# Patient Record
Sex: FEMALE | Race: AMERICAN INDIAN OR ALASKA NATIVE
[De-identification: names, ages, dates, MRNs, and addresses within clinical notes are randomized per-mention and may not be internally consistent; named-entity substitution may affect disease eponyms.]

---

## 2021-12-07 ENCOUNTER — HOSPITAL ENCOUNTER (EMERGENCY)
Dept: HOSPITAL 5 - ED | Age: 46
Discharge: HOME | End: 2021-12-07
Payer: COMMERCIAL

## 2021-12-07 VITALS — SYSTOLIC BLOOD PRESSURE: 135 MMHG | DIASTOLIC BLOOD PRESSURE: 74 MMHG

## 2021-12-07 DIAGNOSIS — G44.209: ICD-10-CM

## 2021-12-07 DIAGNOSIS — R00.2: ICD-10-CM

## 2021-12-07 DIAGNOSIS — R42: Primary | ICD-10-CM

## 2021-12-07 DIAGNOSIS — R07.9: ICD-10-CM

## 2021-12-07 LAB
ALBUMIN SERPL-MCNC: 4.4 G/DL (ref 3.9–5)
ALT SERPL-CCNC: 21 UNITS/L (ref 7–56)
BASOPHILS # (AUTO): 0 K/MM3 (ref 0–0.1)
BASOPHILS NFR BLD AUTO: 0.6 % (ref 0–1.8)
BUN SERPL-MCNC: 11 MG/DL (ref 7–17)
BUN/CREAT SERPL: 14 %
CALCIUM SERPL-MCNC: 9.8 MG/DL (ref 8.4–10.2)
EOSINOPHIL # BLD AUTO: 0.4 K/MM3 (ref 0–0.4)
EOSINOPHIL NFR BLD AUTO: 5.5 % (ref 0–4.3)
HCT VFR BLD CALC: 42.7 % (ref 30.3–42.9)
HEMOLYSIS INDEX: 9
HGB BLD-MCNC: 13.4 GM/DL (ref 10.1–14.3)
LYMPHOCYTES # BLD AUTO: 1.7 K/MM3 (ref 1.2–5.4)
LYMPHOCYTES NFR BLD AUTO: 22.2 % (ref 13.4–35)
MCHC RBC AUTO-ENTMCNC: 31 % (ref 30–34)
MCV RBC AUTO: 80 FL (ref 79–97)
MONOCYTES # (AUTO): 0.5 K/MM3 (ref 0–0.8)
MONOCYTES % (AUTO): 6.5 % (ref 0–7.3)
PLATELET # BLD: 395 K/MM3 (ref 140–440)
RBC # BLD AUTO: 5.32 M/MM3 (ref 3.65–5.03)

## 2021-12-07 PROCEDURE — 99284 EMERGENCY DEPT VISIT MOD MDM: CPT

## 2021-12-07 PROCEDURE — 85025 COMPLETE CBC W/AUTO DIFF WBC: CPT

## 2021-12-07 PROCEDURE — 82550 ASSAY OF CK (CPK): CPT

## 2021-12-07 PROCEDURE — 36415 COLL VENOUS BLD VENIPUNCTURE: CPT

## 2021-12-07 PROCEDURE — 84443 ASSAY THYROID STIM HORMONE: CPT

## 2021-12-07 PROCEDURE — 80053 COMPREHEN METABOLIC PANEL: CPT

## 2021-12-07 PROCEDURE — 93005 ELECTROCARDIOGRAM TRACING: CPT

## 2021-12-07 PROCEDURE — 71046 X-RAY EXAM CHEST 2 VIEWS: CPT

## 2021-12-07 PROCEDURE — 84484 ASSAY OF TROPONIN QUANT: CPT

## 2021-12-07 PROCEDURE — 84703 CHORIONIC GONADOTROPIN ASSAY: CPT

## 2021-12-07 PROCEDURE — 83735 ASSAY OF MAGNESIUM: CPT

## 2021-12-07 NOTE — XRAY REPORT
CHEST PA AND LATERAL VIEWS



INDICATION: 

cp.



COMPARISON: 

None.



FINDINGS:



Support devices: None.

Heart: Within normal limits. 

Lungs/Pleura: No acute pulmonary or pleural findings.  







IMPRESSION:

1. No acute findings.



Signer Name: Danial Wong MD 

Signed: 12/7/2021 4:02 PM

Workstation Name: [id number]

## 2021-12-07 NOTE — EMERGENCY DEPARTMENT REPORT
ED General Adult HPI





- General


Chief complaint: Chest Pain


Stated complaint: chest pains


Time Seen by Provider: 12/07/21 15:38


Source: patient


Mode of arrival: Ambulatory


Limitations: No Limitations





- History of Present Illness


Initial comments: 





Patient is a 46-year-old female presents emergency room with complaints of 

dizziness that began 3 days ago.  She has associated head pressure, chest pain, 

palpitations, tingling all over.  Patient states that she has been wanting to 

come off of her blood pressure medications so she reports that she has been 

cutting her pills in half herself and has only been taking 5 mg of amlodipine 

daily even though she is prescribed 10 mg.  She did not discuss this with a 

doctor before she did it.  Patient states that she also has a history of anemia 

and has not been taking her iron pills for approximately 2 months.  She denies 

any shortness of breath, cough, hemoptysis, fever, vomiting, diarrhea, leg 

swelling, complete numbness, weakness, speech disturbance, gait disturbance.  No

allergies to medications.  She reports that the paramedics evaluated her 3 days 

ago and her blood pressure was 160/110 systolic.





- Related Data


                                  Previous Rx's











 Medication  Instructions  Recorded  Last Taken  Type


 


Meclizine [Antivert] 25 mg PO TID PRN #30 tablet 12/07/21 Unknown Rx











                                    Allergies











Allergy/AdvReac Type Severity Reaction Status Date / Time


 


No Known Allergies Allergy   Verified 12/07/21 15:51














ED Review of Systems


ROS: 


Stated complaint: chest pains


Other details as noted in HPI





Comment: All other systems reviewed and negative





ED Past Medical Hx





- Medications


Home Medications: 


                                Home Medications











 Medication  Instructions  Recorded  Confirmed  Last Taken  Type


 


Meclizine [Antivert] 25 mg PO TID PRN #30 tablet 12/07/21  Unknown Rx














ED Physical Exam





- General


Limitations: No Limitations


General appearance: alert, in no apparent distress





- Head


Head exam: Present: atraumatic, normocephalic





- Eye


Eye exam: Present: normal appearance





- ENT


ENT exam: Present: mucous membranes moist





- Respiratory


Respiratory exam: Present: normal lung sounds bilaterally.  Absent: respiratory 

distress, wheezes, rales, rhonchi, stridor, chest wall tenderness, accessory 

muscle use, decreased breath sounds, prolonged expiratory





- Cardiovascular


Cardiovascular Exam: Present: regular rate, normal rhythm, normal heart sounds. 

 Absent: systolic murmur, diastolic murmur, rubs, gallop





- Neurological Exam


Neurological exam: Present: alert, oriented X3, CN II-XII intact, normal gait.  

Absent: motor sensory deficit





- Psychiatric


Psychiatric exam: Present: normal affect, normal mood





- Skin


Skin exam: Present: warm, dry, intact





ED Course


                                   Vital Signs











  12/07/21 12/07/21





  15:09 15:48


 


Temperature 98.2 F 


 


Pulse Rate  81


 


Respiratory 15 16





Rate  


 


Blood Pressure 135/74 














ED Medical Decision Making





- Lab Data


Result diagrams: 


                                 12/07/21 15:46





                                 12/07/21 15:46








                                   Lab Results











  12/07/21 12/07/21 12/07/21 Range/Units





  15:46 15:46 15:46 


 


WBC  7.7    (4.5-11.0)  K/mm3


 


RBC  5.32 H    (3.65-5.03)  M/mm3


 


Hgb  13.4    (10.1-14.3)  gm/dl


 


Hct  42.7    (30.3-42.9)  %


 


MCV  80    (79-97)  fl


 


MCH  25 L    (28-32)  pg


 


MCHC  31    (30-34)  %


 


RDW  15.3 H    (13.2-15.2)  %


 


Plt Count  395    (140-440)  K/mm3


 


Lymph % (Auto)  22.2    (13.4-35.0)  %


 


Mono % (Auto)  6.5    (0.0-7.3)  %


 


Eos % (Auto)  5.5 H    (0.0-4.3)  %


 


Baso % (Auto)  0.6    (0.0-1.8)  %


 


Lymph # (Auto)  1.7    (1.2-5.4)  K/mm3


 


Mono # (Auto)  0.5    (0.0-0.8)  K/mm3


 


Eos # (Auto)  0.4    (0.0-0.4)  K/mm3


 


Baso # (Auto)  0.0    (0.0-0.1)  K/mm3


 


Seg Neutrophils %  65.2    (40.0-70.0)  %


 


Seg Neutrophils #  5.0    (1.8-7.7)  K/mm3


 


Sodium   139   (137-145)  mmol/L


 


Potassium   4.1   (3.6-5.0)  mmol/L


 


Chloride   100.0   ()  mmol/L


 


Carbon Dioxide   23   (22-30)  mmol/L


 


Anion Gap   20   mmol/L


 


BUN   11   (7-17)  mg/dL


 


Creatinine   0.8   (0.6-1.2)  mg/dL


 


Estimated GFR   > 60   ml/min


 


BUN/Creatinine Ratio   14   %


 


Glucose   112 H   ()  mg/dL


 


Calcium   9.8   (8.4-10.2)  mg/dL


 


Magnesium   2.40 H   (1.7-2.3)  mg/dL


 


Total Bilirubin   0.20   (0.1-1.2)  mg/dL


 


AST   26   (5-40)  units/L


 


ALT   21   (7-56)  units/L


 


Alkaline Phosphatase   99   ()  units/L


 


Total Creatine Kinase   421 H   ()  units/L


 


Troponin T   < 0.010   (0.00-0.029)  ng/mL


 


Total Protein   7.8   (6.3-8.2)  g/dL


 


Albumin   4.4   (3.9-5)  g/dL


 


Albumin/Globulin Ratio   1.3   %


 


TSH    2.720  (0.270-4.200)  mlU/mL


 


HCG, Qual     (Negative)  














  12/07/21 Range/Units





  15:46 


 


WBC   (4.5-11.0)  K/mm3


 


RBC   (3.65-5.03)  M/mm3


 


Hgb   (10.1-14.3)  gm/dl


 


Hct   (30.3-42.9)  %


 


MCV   (79-97)  fl


 


MCH   (28-32)  pg


 


MCHC   (30-34)  %


 


RDW   (13.2-15.2)  %


 


Plt Count   (140-440)  K/mm3


 


Lymph % (Auto)   (13.4-35.0)  %


 


Mono % (Auto)   (0.0-7.3)  %


 


Eos % (Auto)   (0.0-4.3)  %


 


Baso % (Auto)   (0.0-1.8)  %


 


Lymph # (Auto)   (1.2-5.4)  K/mm3


 


Mono # (Auto)   (0.0-0.8)  K/mm3


 


Eos # (Auto)   (0.0-0.4)  K/mm3


 


Baso # (Auto)   (0.0-0.1)  K/mm3


 


Seg Neutrophils %   (40.0-70.0)  %


 


Seg Neutrophils #   (1.8-7.7)  K/mm3


 


Sodium   (137-145)  mmol/L


 


Potassium   (3.6-5.0)  mmol/L


 


Chloride   ()  mmol/L


 


Carbon Dioxide   (22-30)  mmol/L


 


Anion Gap   mmol/L


 


BUN   (7-17)  mg/dL


 


Creatinine   (0.6-1.2)  mg/dL


 


Estimated GFR   ml/min


 


BUN/Creatinine Ratio   %


 


Glucose   ()  mg/dL


 


Calcium   (8.4-10.2)  mg/dL


 


Magnesium   (1.7-2.3)  mg/dL


 


Total Bilirubin   (0.1-1.2)  mg/dL


 


AST   (5-40)  units/L


 


ALT   (7-56)  units/L


 


Alkaline Phosphatase   ()  units/L


 


Total Creatine Kinase   ()  units/L


 


Troponin T   (0.00-0.029)  ng/mL


 


Total Protein   (6.3-8.2)  g/dL


 


Albumin   (3.9-5)  g/dL


 


Albumin/Globulin Ratio   %


 


TSH   (0.270-4.200)  mlU/mL


 


HCG, Qual  Negative  (Negative)  














- EKG Data


EKG shows normal: sinus rhythm, axis, intervals


Rate: normal





- EKG Data





12/07/21 17:41


LAE


low voltage


no STEMI


no T wave inversion








- Radiology Data


Radiology results: report reviewed





Ordering Physician: TITO PALMER  


Date of Service: 12/07/21  


Procedure(s): XR chest routine 2V  


Accession Number(s): [id number]  


 


cc: TITO PALMER   


 


Fluoro Time In Minutes:   


 


CHEST PA AND LATERAL VIEWS  


 


 INDICATION:   


 cp.  


 


 COMPARISON:   


 None.  


 


 FINDINGS:  


 


 Support devices: None.  


 Heart: Within normal limits.   


 Lungs/Pleura: No acute pulmonary or pleural findings.    


 


 


 


 IMPRESSION:  


 1. No acute findings.  


 


 Signer Name: Danial Wong MD   


 Signed: 12/7/2021 4:02 PM  


 Workstation Name: [id number]   


 


 


Transcribed By: TARIQ  


Dictated By: Danial Wong MD  


Electronically Authenticated By: Danial Wong MD    


Signed Date/Time: 12/07/21 1602                                


 


 


 


DD/DT: 12/07/21 1602                                                            

  


TD/TT:








- Medical Decision Making





Patient is a 46-year-old female presents emergency room with complaints of 

dizziness that began 3 days ago.  She has associated head pressure, chest pain, 

palpitations, tingling all over.  Patient states that she has been wanting to 

come off of her blood pressure medications so she reports that she has been 

cutting her pills in half herself and has only been taking 5 mg of amlodipine 

daily even though she is prescribed 10 mg.  She did not discuss this with a 

doctor before she did it.  Patient states that she also has a history of anemia 

and has not been taking her iron pills for approximately 2 months.  She denies 

any shortness of breath, cough, hemoptysis, fever, vomiting, diarrhea, leg sw

elling, complete numbness, weakness, speech disturbance, gait disturbance.  No 

allergies to medications.  She reports that the paramedics evaluated her 3 days 

ago and her blood pressure was 160/110 systolic. Vitals are stable. Labs are 

stable. Troponin is negative. Chest x-ray  1. No acute findings. EKG with LAE, 

low voltage, no STEMI, no T wave inversion. Patient given meclizine and aspirin 

with improvement of her symptoms. Discussed all results with patient. She was 

feeling much better and ready to go home. Discussed the importance of outpatient

 follow-up. Discussed return precautions. Heart score is 2, low risk for cardiac

 event. Advised patient Please take medication as prescribed as needed.  

Increase your water intake.  Eat a low-sodium diet.  Incorporate 30 to 60 

minutes of daily exercise.  Keep a blood pressure log.  Please take your blood 

pressure medication as prescribed by your doctor.  Follow-up with your primary 

care doctor.  Follow-up with a cardiologist.  Return to emergency room for any 

new or worsening symptoms.


Critical care attestation.: 


If time is entered above; I have spent that time in minutes in the direct care 

of this critically ill patient, excluding procedure time.








ED Disposition


Clinical Impression: 


 Dizziness, Palpitations, Pressure in head





Disposition: 01 HOME / SELF CARE / HOMELESS


Is pt being admited?: No


Does the pt Need Aspirin: Yes (given)


Condition: Stable


Instructions:  Dizziness, Easy-to-Read


Additional Instructions: 


Please take medication as prescribed as needed.  Increase your water intake.  

Eat a low-sodium diet.  Incorporate 30 to 60 minutes of daily exercise.  Keep a 

blood pressure log.  Please take your blood pressure medication as prescribed by

 your doctor.  Follow-up with your primary care doctor.  Follow-up with a 

cardiologist.  Return to emergency room for any new or worsening symptoms.


Prescriptions: 


Meclizine [Antivert] 25 mg PO TID PRN #30 tablet


 PRN Reason: Vertigo


Referrals: 


PRIMARY CARE,MD [Primary Care Provider] - 3-5 Days


MANUELA LOERA MD [Staff Physician] - 3-5 Days


Forms:  Work/School Release Form(ED)


Time of Disposition: 17:28


Print Language: ENGLISH





HEART Score





- HEART Score


History: Slightly suspicious


EKG: Normal


Age: 45-65


Risk factors: 1-2 risk factors


Troponin: 





                                        











Troponin T  < 0.010 ng/mL (0.00-0.029)   12/07/21  15:46    











Troponin: < normal limit


HEART Score: 2

## 2021-12-09 NOTE — ELECTROCARDIOGRAPH REPORT
[facility]

                                       

Test Date:    2021               Test Time:    15:11:06

Pat Name:     TIP RICHEY            Department:   

Patient ID:   [id number]          Room:          

Gender:       F                        Technician:   TOSHIA

:          1975               Requested By: RESHMA MASTERS

Order Number: [id number]              Reading MD:   Janice Bowen

                                 Measurements

Intervals                              Axis          

Rate:         89                       P:            20

SD:           156                      QRS:          49

QRSD:         80                       T:            40

QT:           363                                    

QTc:          442                                    

                           Interpretive Statements

Sinus rhythm

Probable left atrial enlargement

Low voltage, precordial leads

No previous ECG available for comparison

Electronically Signed On 2021 10:19:11 EST by Janice Bowen